# Patient Record
Sex: MALE | Race: BLACK OR AFRICAN AMERICAN | Employment: OTHER | URBAN - METROPOLITAN AREA
[De-identification: names, ages, dates, MRNs, and addresses within clinical notes are randomized per-mention and may not be internally consistent; named-entity substitution may affect disease eponyms.]

---

## 2021-06-09 ENCOUNTER — HOSPITAL ENCOUNTER (EMERGENCY)
Age: 36
Discharge: HOME | End: 2021-06-09
Payer: COMMERCIAL

## 2021-06-09 ENCOUNTER — APPOINTMENT (OUTPATIENT)
Dept: GENERAL RADIOLOGY | Age: 36
End: 2021-06-09
Payer: COMMERCIAL

## 2021-06-09 VITALS
SYSTOLIC BLOOD PRESSURE: 129 MMHG | HEIGHT: 68 IN | RESPIRATION RATE: 16 BRPM | DIASTOLIC BLOOD PRESSURE: 76 MMHG | WEIGHT: 207 LBS | BODY MASS INDEX: 31.37 KG/M2 | TEMPERATURE: 98.5 F | OXYGEN SATURATION: 98 % | HEART RATE: 51 BPM

## 2021-06-09 DIAGNOSIS — S62.639A CLOSED FRACTURE OF TUFT OF DISTAL PHALANX OF FINGER: ICD-10-CM

## 2021-06-09 DIAGNOSIS — S61.219A LACERATION OF FINGER OF RIGHT HAND WITHOUT FOREIGN BODY WITHOUT DAMAGE TO NAIL, UNSPECIFIED FINGER, INITIAL ENCOUNTER: Primary | ICD-10-CM

## 2021-06-09 PROCEDURE — 90471 IMMUNIZATION ADMIN: CPT | Performed by: PHYSICIAN ASSISTANT

## 2021-06-09 PROCEDURE — 73140 X-RAY EXAM OF FINGER(S): CPT

## 2021-06-09 PROCEDURE — 6360000002 HC RX W HCPCS: Performed by: PHYSICIAN ASSISTANT

## 2021-06-09 PROCEDURE — 12001 RPR S/N/AX/GEN/TRNK 2.5CM/<: CPT

## 2021-06-09 PROCEDURE — 90715 TDAP VACCINE 7 YRS/> IM: CPT | Performed by: PHYSICIAN ASSISTANT

## 2021-06-09 PROCEDURE — 99283 EMERGENCY DEPT VISIT LOW MDM: CPT

## 2021-06-09 RX ORDER — BUPIVACAINE HYDROCHLORIDE 5 MG/ML
INJECTION, SOLUTION EPIDURAL; INTRACAUDAL
Status: DISCONTINUED
Start: 2021-06-09 | End: 2021-06-09 | Stop reason: HOSPADM

## 2021-06-09 RX ORDER — BUPIVACAINE HYDROCHLORIDE 5 MG/ML
30 INJECTION, SOLUTION EPIDURAL; INTRACAUDAL ONCE
Status: DISCONTINUED | OUTPATIENT
Start: 2021-06-09 | End: 2021-06-09 | Stop reason: HOSPADM

## 2021-06-09 RX ORDER — NAPROXEN 500 MG/1
500 TABLET ORAL 2 TIMES DAILY
Qty: 20 TABLET | Refills: 0 | Status: ON HOLD | OUTPATIENT
Start: 2021-06-09 | End: 2021-07-27

## 2021-06-09 RX ADMIN — TETANUS TOXOID, REDUCED DIPHTHERIA TOXOID AND ACELLULAR PERTUSSIS VACCINE, ADSORBED 0.5 ML: 5; 2.5; 8; 8; 2.5 SUSPENSION INTRAMUSCULAR at 13:54

## 2021-06-09 ASSESSMENT — ENCOUNTER SYMPTOMS
COUGH: 0
BACK PAIN: 0
SHORTNESS OF BREATH: 0
COLOR CHANGE: 0

## 2021-06-09 ASSESSMENT — PAIN SCALES - GENERAL: PAINLEVEL_OUTOF10: 10

## 2021-06-09 NOTE — ED PROVIDER NOTES
confusion. All other systems reviewed and are negative. Positives and Pertinent negatives as per HPI. Except as noted above in the ROS, all other systems were reviewed and negative. PAST MEDICAL HISTORY   History reviewed. No pertinent past medical history. SURGICAL HISTORY   History reviewed. No pertinent surgical history. [address]       Discharge Medication List as of 6/9/2021  2:53 PM      CONTINUE these medications which have NOT CHANGED    Details   acetaminophen (APAP EXTRA STRENGTH) 500 MG tablet 1 tablet by mouth with breakfast, lunch, dinner, and before bedtime every day for the next 5 days. Take this with your Motrin., Disp-20 tablet, R-0Print               ALLERGIES     Patient has no known allergies. FAMILYHISTORY     History reviewed. No pertinent family history. SOCIAL HISTORY       Social History     Tobacco Use    Smoking status: Former Smoker    Smokeless tobacco: Never Used   Vaping Use    Vaping Use: Never used   Substance Use Topics    Alcohol use: Yes     Comment: once a week    Drug use: Yes     Types: Marijuana     Comment: daily       SCREENINGS             PHYSICAL EXAM    (up to 7 for level 4, 8 or more for level 5)     ED Triage Vitals [06/09/21 1332]   BP Temp Temp Source Pulse Resp SpO2 Height Weight   129/76 98.5 °F (36.9 °C) Oral 51 16 98 % 5' 8\" (1.727 m) 207 lb (93.9 kg)       Physical Exam  Vitals and nursing note reviewed. Constitutional:       Appearance: Normal appearance. He is well-developed. He is not toxic-appearing or diaphoretic. HENT:      Head: Normocephalic and atraumatic. Right Ear: External ear normal.      Left Ear: External ear normal.      Nose: Nose normal.      Mouth/Throat:      Mouth: Mucous membranes are moist.      Pharynx: Oropharynx is clear. Eyes:      General:         Right eye: No discharge. Left eye: No discharge. Cardiovascular:      Rate and Rhythm: Normal rate.       Pulses: FINGERS     6/9/2021 2:01 pm     COMPARISON:   None. HISTORY:   ORDERING SYSTEM PROVIDED HISTORY: injury   TECHNOLOGIST PROVIDED HISTORY:   Reason for exam:->injury   Reason for Exam: laceration   Acuity: Acute   Type of Exam: Initial   Mechanism of Injury: cut with metal garage door     FINDINGS:   There is no evidence of fracture, malalignment, or other acute osseous   abnormality. Is a nondisplaced fracture of the 3rd phalangeal tuft best   appreciated on the lateral view. PROCEDURES   Unless otherwise noted below, none     Lac Repair    Date/Time: 6/9/2021 1:48 PM  Performed by: Oliver Ashley PA-C  Authorized by: Oliver Ashley PA-C     Consent:     Consent obtained:  Verbal    Consent given by:  Patient    Risks discussed:  Pain    Alternatives discussed:  Referral  Anesthesia (see MAR for exact dosages):      Anesthesia method:  Nerve block    Block location:  Digital nerve    Block needle gauge:  27 G    Block anesthetic:  Bupivacaine 0.5% w/o epi    Block injection procedure:  Anatomic landmarks identified, anatomic landmarks palpated, introduced needle, negative aspiration for blood and incremental injection    Block outcome:  Anesthesia achieved  Laceration details:     Location:  Finger    Finger location:  R long finger    Length (cm):  2.5    Depth (mm):  1  Repair type:     Repair type:  Simple  Pre-procedure details:     Preparation:  Patient was prepped and draped in usual sterile fashion  Exploration:     Hemostasis achieved with:  Direct pressure    Wound exploration: wound explored through full range of motion and entire depth of wound probed and visualized      Wound extent: no foreign bodies/material noted, no muscle damage noted, no nerve damage noted, no tendon damage noted, no underlying fracture noted and no vascular damage noted      Contaminated: no    Treatment:     Area cleansed with:  Hibiclens    Amount of cleaning:  Extensive    Irrigation solution: Sterile saline    Irrigation volume:  1 L    Irrigation method:  Tap  Skin repair:     Repair method:  Sutures    Suture size:  5-0    Suture material:  Nylon    Suture technique:  Simple interrupted    Number of sutures:  3  Approximation:     Approximation:  Close  Post-procedure details:     Dressing:  Non-adherent dressing and sterile dressing    Patient tolerance of procedure: Tolerated well, no immediate complications        CRITICAL CARE TIME   N/A    CONSULTS:  None      EMERGENCY DEPARTMENT COURSE and DIFFERENTIAL DIAGNOSIS/MDM:   Vitals:    Vitals:    06/09/21 1332   BP: 129/76   Pulse: 51   Resp: 16   Temp: 98.5 °F (36.9 °C)   TempSrc: Oral   SpO2: 98%   Weight: 207 lb (93.9 kg)   Height: 5' 8\" (1.727 m)       Patient was given the following medications:  Medications   bupivacaine (PF) (MARCAINE) 0.5 % injection 150 mg (has no administration in time range)   bupivacaine (PF) (MARCAINE) 0.5 % injection (has no administration in time range)   Tetanus-Diphth-Acell Pertussis (BOOSTRIX) injection 0.5 mL (0.5 mLs Intramuscular Given 6/9/21 1354)           This patient presents to the emergency department with lacerations to right index and middle finger status post crushing injury. Although patient has a nondisplaced phalangeal tuft fracture, there is no overlying laceration, therefore do not consider this open fracture. He tolerated laceration repair well without immediate complications or emesis. Wound care applied. AlumaFoam splint placed on the middle digit of the right hand. Advised to follow-up with PCP and was given orthopedic hand referral.  Will be sent home with anti-inflammatory as needed for pain.     My suspicion is low for subungual hematoma, paronychia, eponychia, felon, flexor tenosynovitis,  ACS, PE, thoracic aortic dissection, thoracic outlet obstruction, SVC syndrome, foreign body, tendon rupture, compartment syndrome, acute open fracture, dislocation, DVT, arterial compromise or occlusion, limb ischemia, gout, septic joint, abscess, cellulitis, osteomyelitis, or other concerning pathology. FINAL IMPRESSION      1. Laceration of right index and right middle finger of right hand without foreign body without damage to nail, unspecified finger, initial encounter    2.  Closed fracture of tuft of distal phalanx of finger          DISPOSITION/PLAN   DISPOSITION Decision To Discharge 06/09/2021 02:46:33 PM      PATIENT REFERRED TO:  Carissa Hensley MD  [address] [address]  [phone]      for follow up in 1-3 days    [facility] Emergency Department  [place] [address]  [phone]    If symptoms worsen    Daryle Artist, MD  [facility], [address] [place] [address],[address]  [address] [place], [place] [id number]      for orthopedic hand follow up      DISCHARGE MEDICATIONS:  Discharge Medication List as of 6/9/2021  2:53 PM      START taking these medications    Details   naproxen (NAPROSYN) 500 MG tablet Take 1 tablet by mouth 2 times daily for 20 doses, Disp-20 tablet, R-0Print             DISCONTINUED MEDICATIONS:  Discharge Medication List as of 6/9/2021  2:53 PM      STOP taking these medications       ibuprofen (IBU) 800 MG tablet Comments:   Reason for Stopping:                      (Please note that portions of this note were completed with a voice recognition program.  Efforts were made to edit the dictations but occasionally words are mis-transcribed.)    Corey Hutson PA-C (electronically signed)           Corey Hutson PA-C  06/09/21 [id number]

## 2021-06-10 ENCOUNTER — TELEPHONE (OUTPATIENT)
Dept: ORTHOPEDIC SURGERY | Age: 36
End: 2021-06-10

## 2021-06-18 ENCOUNTER — TELEPHONE (OUTPATIENT)
Dept: ORTHOPEDIC SURGERY | Age: 36
End: 2021-06-18

## 2021-06-18 NOTE — TELEPHONE ENCOUNTER
Appointment Request     Patient requesting earlier appointment: Yes  Appointment offered to patient: YES  Patient Contact Number: [phone]    PATIENT SEEN IN  ER 6/9/21 FOR RT INDEX AND MIDDLE FINGER. PATIENT WAS TOLD AT THAT TIME THAT HIS FINGER WAS FX.    1ST AVAIL IN  WAS WITH NATHALIE ON 7/14/21.    1ST AVAIL IN [facility] WITH DR. Gina Haney 7/6/21.     PLEASE CALL BACK PATIENT AT THE ABOVE NUMBER

## 2021-07-06 ENCOUNTER — OFFICE VISIT (OUTPATIENT)
Dept: ORTHOPEDIC SURGERY | Age: 36
End: 2021-07-06
Payer: COMMERCIAL

## 2021-07-06 VITALS — BODY MASS INDEX: 28.88 KG/M2 | RESPIRATION RATE: 16 BRPM | WEIGHT: 195 LBS | HEIGHT: 69 IN

## 2021-07-06 DIAGNOSIS — S56.429S EXTENSOR TENDON LACERATION OF FINGER WITH OPEN WOUND, SEQUELA: Primary | ICD-10-CM

## 2021-07-06 DIAGNOSIS — S62.662B OPEN NONDISPLACED FRACTURE OF DISTAL PHALANX OF RIGHT MIDDLE FINGER, INITIAL ENCOUNTER: ICD-10-CM

## 2021-07-06 DIAGNOSIS — S61.209S EXTENSOR TENDON LACERATION OF FINGER WITH OPEN WOUND, SEQUELA: Primary | ICD-10-CM

## 2021-07-06 PROBLEM — S56.429A EXTENSOR TENDON LACERATION OF FINGER WITH OPEN WOUND: Status: ACTIVE | Noted: 2021-07-06

## 2021-07-06 PROBLEM — S61.209A EXTENSOR TENDON LACERATION OF FINGER WITH OPEN WOUND: Status: ACTIVE | Noted: 2021-07-06

## 2021-07-06 PROCEDURE — 99203 OFFICE O/P NEW LOW 30 MIN: CPT | Performed by: ORTHOPAEDIC SURGERY

## 2021-07-06 PROCEDURE — G8419 CALC BMI OUT NRM PARAM NOF/U: HCPCS | Performed by: ORTHOPAEDIC SURGERY

## 2021-07-06 PROCEDURE — 1036F TOBACCO NON-USER: CPT | Performed by: ORTHOPAEDIC SURGERY

## 2021-07-06 PROCEDURE — G8427 DOCREV CUR MEDS BY ELIG CLIN: HCPCS | Performed by: ORTHOPAEDIC SURGERY

## 2021-07-06 NOTE — LETTER
CONSENT TO OPERATION  AND/OR OTHER PROCEDURE(S)          PATIENT : Efren Gallegos   YOB: 1985      DATE : 7/6/21          1. I request and consent that Dr. Reji Meza. Denisa Garza,  and/or his associates or assistants perform an operation and/or procedures on the above patient at  [place] [address], to treat the condition(s) which appear indicated by the diagnostic studies already performed. I have been told that in general terms the nature, purpose and reasonable expectations of the operation and/or procedure(s) are:     Secondary Repair Lacerated Extensor Tendon Right Middle Finger (DIP Joint)      2. It has been explained to me by the informing physician that during the course of the operation and/or procedure(s) unforeseen conditions may be revealed that necessitate an extension of the original operation and/or procedure(s) or different operation and/or procedures than those set forth in Paragraph 1. I therefore authorize and request that my physician and/or his associates or assistants perform such operations and/or procedures as are necessary and desirable in the exercise of professional judgment. The authority granted under this Paragraph 2 shall extend to all conditions that require treatment and are known to my physician at the time the operation is commenced. 3. I have been made aware by the informing physician of certain risks and consequences that are associated with the operation and/or procedure(s) described in Paragraph 1, the reasonable alternative methods or treatment, the possible consequences, the possibility that the operation and/or procedure(s) may be unsuccessful and the possibility of complications.   I understand the reasonably known risks to be:      - Bleeding  - Infection  - Poor Healing  - Possible Damage to Nerve, Vessel, Tendon/Muscle or Bone  - Need for further Treatment/Surgery  - Stiffness  - Pain  - Residual or Recurrent Symptoms  - Anesthetic and/or Medical Risks  - We have discussed the specific limitations and risks of hospital and/or office based treatment at this time due to the COVID-19 pandemic                I have been counseled about the risks of jim Covid-19 in the rissa-operative and post-operative periods related to this procedure. I have been made aware that jim Covid-19 around the time of a surgical procedure may worsen my prognosis for recovering from the virus and lend to a higher morbidity and or mortality risk. With this knowledge, I have requested to proceed with the procedure as scheduled. 4. I have also been informed by the informing physician that there are other risks from both known and unknown causes that are attendant to the performance of any surgical procedure. I am aware that the practice of medicine and surgery is not an exact science, and that no guarantees have been made to me concerning the results of the operation and/or procedure(s). 5. I   CONSENT / REFUSE CONSENT  (strike the phrase that does not apply) to the taking of photographs before, during and/or after the operation or procedure for scientific/educational purposes. 6. I consent to the administration of anesthesia and to the use of such anesthetics as may be deemed advisable by the anesthesiologist who has been engaged by me or my physician.     7. I certify that I have read and understand the above consent to operation and/or other procedure(s); that the explanations therein referred to were made to me by the informing physician in advance of my signing this consent; that all blanks or statements requiring insertion or completion were filled in and inapplicable paragraphs, if any, were stricken before I signed; and that all questions asked by me about the operation and/or procedure(s) which I have consented to have been fully answered in a satisfactory manner.                                 _______________________           7/6/21 Witness     Signature Of Patient         Date        Urmila Carreon                                                 Informing Physician                                           Signature of Informing Physician                              If patient is unable to sign or is a minor, complete one of the following:    (A)  Patient is a minor   years of age. (B)  Patient is unable to sign because: The undersigned represents that he or she is duly authorized to execute this consent for and on behalf of the above named patient. Witness               o  Parent  o  Guardian   o  Spouse       o  Other (specify)                                                          Patient Name: Roberto Vidal  Patient YOB: 1985  Dr. Lillian Johnson' Return To Work Policy  Regarding your ability to return to work after surgery or injury, Dr. Lillian Johnson will not state that any patient is off of work or cannot work at all. He will place you on restrictions after your surgical procedure or injury. This means that you will be allowed to return to work the day after your office visit or surgery with restrictions. Depending on the details of your particular situation, Dr. Lillian Johnson may state that you will have either light use or no use of your hand for a specific number of weeks. It is your obligation to communicate with your employer regarding your restrictions. It is your employer's decision as to whether they will accommodate your restrictions (i.e. allow you to come to work in your restricted capacity) or to not allow you to return to work under your restrictions. Dr. Lillian Johnson does not participate in making this decision and cannot influence your employer regarding their decision. If you do not communicate your restrictions to your employer, or if you do not present to work as you are scheduled to, Dr. Lillian Johnson will not provide an 'excuse' to explain your absence.   A doctors note, or

## 2021-07-06 NOTE — LETTER
[facility] SURGERY  Surgery Scheduling Form:      DEMOGRAPHICS:                                                                                                                Patient Name:  Alena Logan  Patient :  1985   Patient SS#:  [id number]    Patient Phone:  [phone] (home)     Patient Address:  [address] [place] [id number]    PCP:  No primary care provider on file. Insurance:   Payor/Plan Subscr  Sex Relation Sub. Ins. ID Effective Group Num   1. [employer] - * DORA HEBERT 1985 Male Self [id number] 3/1/19 [employer]OHIO                                   [address]     DIAGNOSIS & PROCEDURE:                                                                                              Diagnosis:   right Middle Finger Terminal Extensor Tendon Laceration (883.2)   S56.429S  Operation:  right Middle Finger Terminal Extensor Tendon Repair  [CPT: 00380]  Location:  [place]   Surgeon:  Boubacar Case    SCHEDULING INFORMATION:                                                                                         .    Surgeon's Scheduling Instruction:  elective    RN Post-op Appt:  [] Yes   [x] No  Preferred Thursday:   [x] Yes   [] No    Requested Date:   PT NOT NPO- Case CXLD  OR Time:  12:30 Patient Arrival Time:  11:00  OR Time Required:  30  Minutes  Anesthesia:  General  Equipment:  Lead Hand,  4-0 Fiber-Wire suture, Tendon retrievers, K-wires  Mini C-Arm:  No   Standard C-Arm:  No  Status:  Outpatient                   COVID:  RAPID  PAT Required:  Yes  Comments: Patient wants ASAP but not urgent. Daron Hensley MD  21 12:56 PM  BILLING INFORMATION:                                                                                                     Procedure:       CPT Code Modifier    right Middle Finger Terminal Extensor Tendon Repair       .          Pre Operative Physician Prophylaxis Orders - SCIP Protocols      Pre-Operative Antibiotic Order:    No Known Allergies       []  ----  No Antibiotic Ordered       [x]  ----  Give the following Antibiotic within 1 hour prior to start time:         Ancef 1 gram IV if patient is less than 200 pounds    or       Ancef 2 grams IV if patient is greater than 200 pounds    or      Vancomycin 1 gram IV (over 1 hour) if patient is allergic to           PENICILLINS or CEFALOSPORINS     SURG   7                           COVID  RAPID                   H&P AT PCP       Procedure: right Middle Finger Terminal Extensor Tendon Repair     Patient: Albinabrian Riri  :    1985    Physician Signature:     Date: 21  Time: 12:56 PM

## 2021-07-06 NOTE — PROGRESS NOTES
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 He noticed pain, swelling and bleeding of the dorsum of his right middle and index fingers. He was evaluated at the [facility] were obtained, the patients dorsal lacerations were sutured, tetanus updated and the patient was referred for hand/upper extremity evaluation and treatment. There is no history of additional significant injury. He removed his own sutures and found that he was unable to actively extend his middle finger. Symptoms have not changed recently. The pain assessment has been reviewed and is correct. The patient's social history, past medical history, family history, medications, allergies and review of systems, entered 7/6/21,  have been reviewed, and dated and are recorded in the chart. On physical examination the patient is Height: 5' 9\" (175.3 cm) tall and weighs Weight: 195 lb (88.5 kg). Respirations are 18 per minute. The patient is well nourished, is oriented to time and place, demonstrates appropriate mood and affect as well as normal gait and station. There is mild soft tissue swelling present about the right index finger, middle finger. There are healed oblique lacerations over the dorsum of the DIP joints. There is mild discoloration. There is a flexion deformity of the DIP joint of the middle finger, with no active extension. .   Tenderness is not present on palpation in the area of the right index finger, middle finger. Skin is intact, as is distal circulation and sensation. Gross muscle strength is normal bilaterally   Hand and wrist joints are stable. There are no subcutaneous nodules or enlarged epitrochlear lymph nodes.     I have personally reviewed and interpreted all previous external imaging studies, laboratory tests, diagnostic proceedures and medical encounters pertinent to this patient's visit today. Xrays: Review and independent interpretation of the recent external Xrays of the right middle finger demonstrate a nondisplaced tuft fracture. Impression: Laceration distal extensor tendon right middle finger. Healed tuft fracture right middle finger. The nature of their medical problem is fully discussed with the patient, including all treatment options. Surgery is also discussed, including the possible risks, complications, prognosis and postoperative care. All questions are answered. The surgery consent form is explained and signed. Surgery will be scheduled and the patient is asked to call me if there are any additional questions. The patient understands that the surgery will be done by Dr. Bhupendra Aparicio.

## 2021-07-06 NOTE — Clinical Note
PT arrived 1 hour late and NOT NPO. CASE CANCELLED. DO NOT RESCHEDULE WITHOUT SPEAKING WITH ME. Thanks.   CBW

## 2021-07-09 ENCOUNTER — TELEPHONE (OUTPATIENT)
Dept: ORTHOPEDIC SURGERY | Age: 36
End: 2021-07-09

## 2021-07-09 NOTE — TELEPHONE ENCOUNTER
CPT: 53398  BODY PART: right finger  AUTHORIZATION: approved    Submitted online with Allie 7/9/21    Per Allie, case is pending 7/16/21    Per Allie, [facility] 7/20/21    Per Adonis Gutiérrez with Pradeep Thurman is required. Leadership will be made aware of mistake and this precert will be reprocessed 7/20/21    Approved # [id number]  7/27/21-10/27/21    Called Allie to update authorization.   Same auth#  Now good through 12/31/21

## 2021-07-23 NOTE — PROGRESS NOTES
SAFETY FIRST. .call before you fall      [address]  time____1100________        Surgery time____________    Take the following medications with a sip of water: Follow your Doctor's pre procedure instructions regarding medications    Do not eat or drink anything after 12:00 midnight prior to your surgery. This includes water chewing gum, mints and ice chips. You may brush your teeth and gargle the morning of your surgery, but do not swallow the water     Please see your family doctor/pediatrician for a history and physical and/or concerning medications. Bring any test results/reports from your physicians office. If you are under the care of a heart doctor or specialist doctor, please be aware that you may be asked to them for clearance    You may be asked to stop blood thinners such as Coumadin, Plavix, Fragmin, Lovenox, etc., or any anti-inflammatories such as:  Aspirin, Ibuprofen, Advil, Naproxen prior to your surgery. We also ask that you stop any OTC medications such as fish oil, vitamin E, glucosamine, garlic, Multivitamins, COQ 10, etc.    We ask that you do not smoke 24 hours prior to surgery  We ask that you do not  drink any alcoholic beverages 24 hours prior to surgery     You must make arrangements for a responsible adult to take you home after your surgery. For your safety you will not be allowed to leave alone or drive yourself home. Your surgery will be cancelled if you do not have a ride home. Also for your safety, it is strongly suggested that someone stay with you the first 24 hours after your surgery. A parent or legal guardian must accompany a child scheduled for surgery and plan to stay at the hospital until the child is discharged. Please do not bring other children with you. For your comfort, please wear simple loose fitting clothing to the hospital.  Please do not bring valuables.     Do not wear any make-up or nail polish on your fingers or toes      For your safety, please do not wear any jewelry or body piercing's on the day of surgery. All jewelry must be removed. If you have dentures, they will be removed before going to operating room. For your convenience, we will provide you with a container. If you wear contact lenses or glasses, they will be removed, please bring a case for them. If you have a living will and a durable power of  for healthcare, please bring in a copy. As part of our patient safety program to minimize surgical site infections, we ask you to do the following:    · Please notify your surgeon if you develop any illness between         now and the  day of your surgery. · This includes a cough, cold, fever, sore throat, nausea,         or vomiting, and diarrhea, etc.  ·  Please notify your surgeon if you experience dizziness, shortness         of breath or blurred vision between now and the time of your surgery. Do not shave your operative site 96 hours prior to surgery. For face and neck surgery, men may use an electric razor 48 hours   prior to surgery. You may shower the night before surgery or the morning of   your surgery with an antibacterial soap. You will need to bring a photo ID and insurance card    [facility] has an onsite pharmacy, would you like to utilize our pharmacy     If you will be staying overnight and use a C-pap machine, please bring   your C-pap to hospital     Our goal is to provide you with excellent care, therefore, visitors will be limited to two(2) in the room at a time so that we may focus on providing this care for you. Please contact pre-admission testing if you have any further questions.                  [facility] phone number:  [phone] PAT fax number:  [phone]  Please note these are generalized instructions for all surgical cases, you may be provided with more specific instructions according to your surgery. Preoperative Screening for Elective Surgery/Invasive Procedures While COVID-19 present in the community     Have you tested positive or have been told to self-isolate for COVID-19 like symptoms within the past 28 days? no   Do you currently have any of the following symptoms?no  o Fever >100.0 F or 99.9 F in immunocompromised patients? New onset cough, shortness of breath or difficulty breathing?  o New onset sore throat, myalgia (muscle aches and pains), headache, loss of taste/smell or diarrhea?  Have you had a potential exposure to COVID-19 within the past 14 days by:  o Close contact with a confirmed case? o Close contact with a healthcare worker,  or essential infrastructure worker (grocery store, [employer], gas station, public utilities or transportation)? o Do you reside in a congregate setting such as; skilled nursing facility, adult home, correctional facility, homeless shelter or other institutional setting?  o Have you had recent travel to a known COVID-19 hotspot? Indicate if the patient has a positive screen by answering yes to one or more of the above questions. Patients who test positive or screen positive prior to surgery or on the day of surgery should be evaluated in conjunction with the surgeon/proceduralist/anesthesiologist to determine the urgency of the procedure.

## 2021-07-23 NOTE — PROGRESS NOTES
Patient stated he will be going to an urgent care or [facility] for H&P, instructed to make the appointment as soon as possible. Instructed to bring a hard copy and have it faxed to [phone].

## 2021-07-26 ENCOUNTER — TELEPHONE (OUTPATIENT)
Dept: ORTHOPEDIC SURGERY | Age: 36
End: 2021-07-26

## 2021-07-27 ENCOUNTER — HOSPITAL ENCOUNTER (OUTPATIENT)
Age: 36
Setting detail: OUTPATIENT SURGERY
Discharge: HOME | End: 2021-07-27
Attending: ORTHOPAEDIC SURGERY | Admitting: ORTHOPAEDIC SURGERY
Payer: COMMERCIAL

## 2021-07-27 VITALS
OXYGEN SATURATION: 96 % | DIASTOLIC BLOOD PRESSURE: 85 MMHG | WEIGHT: 202.71 LBS | HEIGHT: 68 IN | BODY MASS INDEX: 30.72 KG/M2 | HEART RATE: 54 BPM | SYSTOLIC BLOOD PRESSURE: 134 MMHG | TEMPERATURE: 97.1 F | RESPIRATION RATE: 16 BRPM

## 2021-07-27 LAB — SARS-COV-2, NAAT: NOT DETECTED

## 2021-07-27 PROCEDURE — 87635 SARS-COV-2 COVID-19 AMP PRB: CPT

## 2021-07-27 RX ORDER — SODIUM CHLORIDE 0.9 % (FLUSH) 0.9 %
10 SYRINGE (ML) INJECTION EVERY 12 HOURS SCHEDULED
Status: DISCONTINUED | OUTPATIENT
Start: 2021-07-27 | End: 2021-07-27 | Stop reason: HOSPADM

## 2021-07-27 RX ORDER — SODIUM CHLORIDE 9 MG/ML
25 INJECTION, SOLUTION INTRAVENOUS PRN
Status: DISCONTINUED | OUTPATIENT
Start: 2021-07-27 | End: 2021-07-27 | Stop reason: HOSPADM

## 2021-07-27 RX ORDER — SODIUM CHLORIDE 9 MG/ML
INJECTION, SOLUTION INTRAVENOUS CONTINUOUS
Status: DISCONTINUED | OUTPATIENT
Start: 2021-07-27 | End: 2021-07-27 | Stop reason: HOSPADM

## 2021-07-27 RX ORDER — SODIUM CHLORIDE 0.9 % (FLUSH) 0.9 %
10 SYRINGE (ML) INJECTION PRN
Status: DISCONTINUED | OUTPATIENT
Start: 2021-07-27 | End: 2021-07-27 | Stop reason: HOSPADM

## 2021-07-27 ASSESSMENT — PAIN - FUNCTIONAL ASSESSMENT: PAIN_FUNCTIONAL_ASSESSMENT: 0-10

## 2021-07-27 NOTE — PROGRESS NOTES
I called patient to see if he was on his way in. He  was very agitated and said \"they told me to be there 1/2 hour before my appointment\". I asked who \"they\" was. He said all the phone calls, \"very unprofessional\" and \"I should just cancel\". I told him that was his choice. He didn't understand why he needed to be here 1-1/2 hour before his procedure. I let him know. ..labs, questions, VS, IV's, etc. He said he would head on in but \"they\" told him 12:00.

## 2021-07-27 NOTE — PROGRESS NOTES
Pt drank 12 oz of milk protein drink about an hour ago. Charge RN and Dr. Harry Smith informed. Pt canceled.

## 2021-08-29 ENCOUNTER — APPOINTMENT (OUTPATIENT)
Dept: GENERAL RADIOLOGY | Age: 36
End: 2021-08-29
Payer: COMMERCIAL

## 2021-08-29 ENCOUNTER — HOSPITAL ENCOUNTER (EMERGENCY)
Age: 36
Discharge: HOME | End: 2021-08-29
Payer: COMMERCIAL

## 2021-08-29 VITALS
RESPIRATION RATE: 12 BRPM | BODY MASS INDEX: 31.01 KG/M2 | HEIGHT: 68 IN | DIASTOLIC BLOOD PRESSURE: 92 MMHG | SYSTOLIC BLOOD PRESSURE: 152 MMHG | OXYGEN SATURATION: 100 % | WEIGHT: 204.59 LBS | TEMPERATURE: 98.1 F | HEART RATE: 50 BPM

## 2021-08-29 DIAGNOSIS — S51.812A FOREARM LACERATION, LEFT, INITIAL ENCOUNTER: Primary | ICD-10-CM

## 2021-08-29 PROCEDURE — 12002 RPR S/N/AX/GEN/TRNK2.6-7.5CM: CPT

## 2021-08-29 PROCEDURE — 99282 EMERGENCY DEPT VISIT SF MDM: CPT

## 2021-08-29 PROCEDURE — 73090 X-RAY EXAM OF FOREARM: CPT

## 2021-08-29 RX ORDER — LIDOCAINE HYDROCHLORIDE AND EPINEPHRINE 10; 10 MG/ML; UG/ML
20 INJECTION, SOLUTION INFILTRATION; PERINEURAL ONCE
Status: DISCONTINUED | OUTPATIENT
Start: 2021-08-29 | End: 2021-08-29 | Stop reason: HOSPADM

## 2021-08-29 RX ORDER — IBUPROFEN 600 MG/1
600 TABLET ORAL EVERY 6 HOURS PRN
Qty: 20 TABLET | Refills: 0 | Status: SHIPPED | OUTPATIENT
Start: 2021-08-29

## 2021-08-29 ASSESSMENT — PAIN DESCRIPTION - ORIENTATION
ORIENTATION: LEFT
ORIENTATION: LEFT

## 2021-08-29 ASSESSMENT — PAIN DESCRIPTION - FREQUENCY: FREQUENCY: CONTINUOUS

## 2021-08-29 ASSESSMENT — PAIN SCALES - GENERAL
PAINLEVEL_OUTOF10: 10
PAINLEVEL_OUTOF10: 7

## 2021-08-29 ASSESSMENT — PAIN DESCRIPTION - LOCATION
LOCATION: ARM
LOCATION: ARM

## 2021-08-29 ASSESSMENT — PAIN DESCRIPTION - DESCRIPTORS: DESCRIPTORS: BURNING;THROBBING

## 2021-08-29 ASSESSMENT — PAIN - FUNCTIONAL ASSESSMENT: PAIN_FUNCTIONAL_ASSESSMENT: PREVENTS OR INTERFERES WITH MANY ACTIVE NOT PASSIVE ACTIVITIES

## 2021-08-29 ASSESSMENT — PAIN DESCRIPTION - PROGRESSION: CLINICAL_PROGRESSION: GRADUALLY WORSENING

## 2021-08-29 ASSESSMENT — PAIN DESCRIPTION - PAIN TYPE: TYPE: ACUTE PAIN

## 2021-08-29 NOTE — ED PROVIDER NOTES
[facility]  [address] [address]  Dept: [phone]  Loc: [address] ENCOUNTER        This patient was not seen or evaluated by the attending physician. I evaluated this patient, the attending physician was available for consultation. CHIEF COMPLAINT    Chief Complaint   Patient presents with    Laceration     left posterior forearm laceration. patient states arm went through a glass window on an old garage door. bleeding controlled. tetanus up to date       HPI    Maxine Stevens is a 39 y.o. male who lacerated his left forearm. The mechanism of the injury was that he cut his arm on a piece of glass. This occurred shortly prior to arrival.  Associated bleeding was alleviated by pressure. REVIEW OF SYSTEMS    Neurologic: No numbness or weakness distal to the wound  Skin: see HPI  Musculoskeletal: No bony deformity  Immunization: Tetanus status up to date from 6/2021    [address]    History reviewed. No pertinent past medical history. Past Surgical History:   Procedure Laterality Date    DENTAL SURGERY         CURRENT MEDICATIONS    Current Outpatient Rx   Medication Sig Dispense Refill    acetaminophen (APAP EXTRA STRENGTH) 500 MG tablet 1 tablet by mouth with breakfast, lunch, dinner, and before bedtime every day for the next 5 days. Take this with your Motrin.  20 tablet 0       ALLERGIES    No Known Allergies    SOCIAL & FAMILY HISTORY    Social History     Socioeconomic History    Marital status:      Spouse name: None    Number of children: None    Years of education: None    Highest education level: None   Occupational History    None   Tobacco Use    Smoking status: Former Smoker    Smokeless tobacco: Never Used   Vaping Use    Vaping Use: Never used   Substance and Sexual Activity    Alcohol use: Yes     Comment: once a week    Drug use: Yes     Types: Marijuana     Comment: daily    Sexual activity: Yes   Other Topics Concern    None   Social History Narrative    None     Social Determinants of Health     Financial Resource Strain:     Difficulty of Paying Living Expenses:    Food Insecurity:     Worried About Running Out of Food in the Last Year:     Ran Out of Food in the Last Year:    Transportation Needs:     Lack of Transportation (Medical):  Lack of Transportation (Non-Medical):    Physical Activity:     Days of Exercise per Week:     Minutes of Exercise per Session:    Stress:     Feeling of Stress :    Social Connections:     Frequency of Communication with Friends and Family:     Frequency of Social Gatherings with Friends and Family:     Attends Protestant Services:     Active Member of Clubs or Organizations:     Attends Club or Organization Meetings:     Marital Status:    Intimate Partner Violence:     Fear of Current or Ex-Partner:     Emotionally Abused:     Physically Abused:     Sexually Abused:      History reviewed. No pertinent family history. PHYSICAL EXAM    VITAL SIGNS: BP (!) 152/92   Pulse 50   Temp 98.1 °F (36.7 °C) (Oral)   Resp 12   Ht 5' 8\" (1.727 m)   Wt 204 lb 9.4 oz (92.8 kg)   SpO2 100%   BMI 31.11 kg/m²   Constitutional:  Well developed, well-nourished  HENT:  atraumatic, no trismus  NECK:  Supple, No neck swelling  Respiratory:  No respiratory distress  Cardiovascular:  No JVD   Neurologic: Motor and sensory distal to the wound is intact and normal, patient is awake, alert, no slurred speech  Vascular: left radial pulse 2+, capillary refill less than 2 seconds  Musculoskeletal:  No edema or deformity  Integument:  +3 cm V-shaped laceration localized over the medial distal volar forearm, the depth down to the subcutaneous tissue, there is no foreign body in the wound, there is no tendon or bone exposed in the wound.     PROCEDURE  Laceration Repair Procedure Note  Performed by: myself  Consent:  Verbal consent obtained by patient. Risk of infection and pain discussed, as well as alternatives. Anesthesia:  The patient was placed in the appropriate position and anesthesia obtained by infiltration using 1% Lidocaine with epinephrine. Repair type:  simple      Pre-procedure:  Patient was prepped and draped in usual sterile fashion  Laceration details:  Location: forearm  Length (cm):  3 cm  Preparation:  Patient was prepped and draped in usual sterile fashion  Exploration: Hemostasis achieved with direct pressure, entire depth of wound probed and visualized   Contaminated: no  Treatment:  Amount of cleaning:  Extensive  Irrigation solution:  High pressure tap water  Visualized foreign bodies/material removed: no  Skin repair:   Repair method:  Sutures  Suture size:  4-0  Suture material:  Nylon  Suture technique:  Simple interrupted  Approximation:  Close  Number of sutures: 5  Dressing:  Sterile dressing and antibiotic ointment  Patient tolerance of procedure: Tolerated well, no immediate complications    RADIOLOGY  XR RADIUS ULNA LEFT (2 VIEWS)   Final Result   Soft tissue wound evident distal left forearm. No associated retained   radiopaque foreign body evident. No acute osseous abnormality. Note that if pain persists or worsens, then additional evaluation with   follow-up x-rays or MRI should be considered. ED COURSE & MEDICAL DECISION MAKING    See chart for details of any medications ordered    Differential diagnosis: Tendon laceration, neurologic injury, vascular injury, involvement of bone that could lead to osteomyelitis, retained foreign body, delayed bacterial skin infection, other    No evidence of neurovascular injury on physical exam.  No evidence of tendon laceration. Plain films as above.     The patient was instructed to follow up as an outpatient in 2 days, and to return in 10 days for suture removal.  The patient was instructed to return to the ED immediately for any new or worsening symptoms. The patient verbalized understanding. FINAL IMPRESSION    1.  Forearm laceration, left, initial encounter        PLAN  Discharge with outpatient follow-up (see EMR)      (Please note that this note was completed with a voice recognition program.  Every attempt was made to edit the dictations, but inevitably there remain words that are mis-transcribed.)        Juliana Mackeyma  08/29/21 1536

## 2021-08-29 NOTE — ED NOTES
Adaptic, and gauze bandage applied to left forearm wound. AVS reviewed with patient. Verbalized understanding. AVS was printed and given to patient. Printed prescription given to patient.      Luis De La Cruz RN  08/29/21 2009

## 2021-09-08 ENCOUNTER — TELEPHONE (OUTPATIENT)
Dept: ORTHOPEDIC SURGERY | Age: 36
End: 2021-09-08

## 2021-09-08 NOTE — TELEPHONE ENCOUNTER
Surgery and/or Procedure Scheduling     Contact Name: Nneka Golden Request: PATIENT IS READY TO [address] [place] [address] - RIGHT HAND  Patient Contact Number: [phone]

## 2021-10-21 ENCOUNTER — TELEPHONE (OUTPATIENT)
Dept: ORTHOPEDIC SURGERY | Age: 36
End: 2021-10-21

## 2021-10-21 NOTE — TELEPHONE ENCOUNTER
General Question     Subject: WOULD LIKE TO KNOW HIS [address] TIME TOMORROW   Patient and /or Facility Request: PATIENT   Contact Number: [phone]

## 2021-10-22 ENCOUNTER — OFFICE VISIT (OUTPATIENT)
Dept: ORTHOPEDIC SURGERY | Age: 36
End: 2021-10-22
Payer: COMMERCIAL

## 2021-10-22 VITALS — BODY MASS INDEX: 30.31 KG/M2 | HEIGHT: 68 IN | WEIGHT: 200 LBS

## 2021-10-22 DIAGNOSIS — S61.209S EXTENSOR TENDON LACERATION OF FINGER WITH OPEN WOUND, SEQUELA: Primary | ICD-10-CM

## 2021-10-22 DIAGNOSIS — S56.429S EXTENSOR TENDON LACERATION OF FINGER WITH OPEN WOUND, SEQUELA: Primary | ICD-10-CM

## 2021-10-22 PROCEDURE — 1036F TOBACCO NON-USER: CPT | Performed by: ORTHOPAEDIC SURGERY

## 2021-10-22 PROCEDURE — G8484 FLU IMMUNIZE NO ADMIN: HCPCS | Performed by: ORTHOPAEDIC SURGERY

## 2021-10-22 PROCEDURE — G8417 CALC BMI ABV UP PARAM F/U: HCPCS | Performed by: ORTHOPAEDIC SURGERY

## 2021-10-22 PROCEDURE — G8427 DOCREV CUR MEDS BY ELIG CLIN: HCPCS | Performed by: ORTHOPAEDIC SURGERY

## 2021-10-22 PROCEDURE — 99214 OFFICE O/P EST MOD 30 MIN: CPT | Performed by: ORTHOPAEDIC SURGERY

## 2021-10-22 NOTE — LETTER
[facility] SURGERY  Surgery Scheduling Form:  DEMOGRAPHICS:                                                                                                              .  Patient Name:  Nereida Mayo  Patient :  1985   Patient SS#:  [id number]    Patient Phone:  [phone] (home)  Alt. Patient Phone:    Patient Address:  [address] [address], [place] [address]    PCP:  No primary care provider on file. Insurance:  Payor/Plan Subscr  Sex Relation Sub. Ins. ID Effective Group Num   1. [employer] - * DORA HEBERT 1985 Male Self [id number] 3/[birth year]/19 [facility] [address]     DIAGNOSIS & PROCEDURE:                                                                                            .  Diagnosis:   right Middle Finger Terminal Extensor Tendon Laceration (883.2) Y75.521S  Operation:  right Middle Finger Terminal Extensor Tendon Repair  [CPT: 40432]  [facility]  [facility]  Surgeon:  Natanael Velazquez    SCHEDULING INFORMATION:                                                                                         .  Surgeon's Scheduling Instruction:  elective    RN Post-op Appt:  [] Yes   [x] No  Preferred Thursday:   [x] Yes   [] No    Requested Date:  CXL  OR Time:  2:45 Patient Arrival Time:    OR Time Required:  30  Minutes  Anesthesia:  General  Equipment:  Lead Hand,  4-0 Fiber-Wire suture, MICRO-MITEK Anchors X2, TPS, K-Wires  Mini C-Arm:  No   Standard C-Arm:  No  Status:  Outpatient                                      COVID  10-29  PAT Required:  Yes  Comments:                      Martell Sow MD  10/22/21 2:25 PM    BILLING INFORMATION:                                                                                                    .    Procedure:       CPT Code Modifier  right Middle Finger Terminal Extensor Tendon Repair     .                              Pre Operative Physician Prophylaxis Orders - SCIP Protocols      Pre-Operative Antibiotic Order:    No Known Allergies       []  ----  No Antibiotic Ordered       [x]  ----  Give the following Antibiotic within 1 hour prior to start time:         Ancef 1 gram IV if patient is less than 200 pounds    or       Ancef 2 grams IV if patient is greater than 200 pounds    or      Vancomycin 1 gram IV (over 1 hour) if patient is allergic to           PENICILLINS or CEFALOSPORINS     SURG    11-4                        COVID    10-29                      H&P AT PCP       Procedure: right Middle Finger Terminal Extensor Tendon Repair      Patient: Tracy Course  :    1985    Physician Signature:     Date: 10/22/21  Time: 2:25 PM

## 2021-10-22 NOTE — PROGRESS NOTES
Mr. Jennifer Torres returns today in follow-up of his prior right Index Finger sharp open  mallet injury injury which occurred approximately 4 months ago. He was evaluated by   Dr. Anjana Echeverria. Vivek Parnell in July and was scheduled for surgical repair of his lacerated terminal extensor tendonit. He did not pursue and complete his surgery scheduling. He returns today with persistent mallet deformity of his right Middle Finger . He notes no symptoms of numbness, tingling, no symptoms related to perfusion. I have today reviewed with Jennifer Torres the clinically relevant, past medical history, medications, allergies,  family history, social history, and Review Of Systems & I have documented any details relevant to today's presenting complaints in my history above. Mr. Lise Phoenix Harper's self-reported past medical history, medications, allergies,  family history, social history, and Review Of Systems have been scanned into the chart under the \"Media\" tab. Physical Exam:  Vitals  Height: 5' 8\" (172.7 cm)  Weight: 200 lb (90.7 kg)  Mr. Jennifer Torres appears well, he is in no apparent distress, he demonstrates appropriate mood & affect. Skin:shows well healed lacerations of the dorsal right Index Finger and Middle Finger. There is no erythema, drainage, or sign of infection. otherwise normal  Bilaterally    Digits: right Middle Finger DIP joint shows full passive extension, 35 degrees active extensor lag, flexion is ful. Other Fingers & Thumb show without significant limitation bilaterally. There is not residual tenderness at the injury site  Wrist range of motion is full bilaterally  There is no evidence of gross joint instability bilaterally.   Sensation is decreased in the 'downstream' distribution from the laceration on the Right, normal on the Left  Vascular examination reveals normal and good capillary refill on the Right, normal on the Left  Swelling is mild in the injured  Middle Finger on the Right, normal on the Left  Muscular strength is clinically appropriate bilaterally. Impression:  Mr. Gareth Borges is doing fairly well after recent right Middle Finger Mallet injury. It would appear that he has not healed his injury and remains concerned about the lack of motion and function. .    Plan:  I have discussed with Mr. Gareth Borges the various treatment options for treatment of right  Middle Finger Terminal Extensor Tendon Laceration which has not healed. We discussed the options of Conservative treatment, and Surgical  repair of the injured tendon & other structures as needed. I have explained the pre-, rissa- ane post-operative considerations to him.  he has elected to proceed with surgical treatment in the form of exploration and repair of the injured tendon & other structures as needed. He has voiced an understanding of the other options available to him. I have explained the complications, limitations, expectations, alternatives, & risks of his chosen treatment. We discussed the possibility of residual symptoms as may be related to surgical treatment of tendon injuries including the possiblities of: Infection, poor healing of tissues, persistant deformity, persistant pain, limitation of motion, future arthritic symptoms & the possible need for further treatment. We also specifically discussed risks related to any surgical procedure including: bleeding, infection, scar formation, & possible need for repeat operation. He understood our discussion and was comfortable with his decision; he was provided with appropriate expectations. I had an extensive discussion with Mr. Gareth Borges  and any family members present regarding the natural history, etiology, and long term consequences of this problem. I have outlined a treatment plan with them and, in my opinion, surgical intervention is indicated at this time.  I have discussed with them the potential complications, limitations, expectations,

## 2021-10-22 NOTE — LETTER
CONSENT TO OPERATION  AND/OR OTHER PROCEDURE(S)          PATIENT : Aissatou Loyola   YOB: 1985      DATE : 10/22/21          1. I request and consent that Dr. Maco Doan. Vanita Thornton,  and/or his associates or assistants perform an operation and/or procedures on the above patient at  [place] [address], to treat the condition(s) which appear indicated by the diagnostic studies already performed. I have been told that in general terms the nature, purpose and reasonable expectations of the operation and/or procedure(s) are:      right Middle Finger Terminal Extensor Tendon Repair       2. It has been explained to me by the informing physician that during the course of the operation and/or procedure(s) unforeseen conditions may be revealed that necessitate an extension of the original operation and/or procedure(s) or different operation and/or procedures than those set forth in Paragraph 1. I therefore authorize and request that my physician and/or his associates or assistants perform such operations and/or procedures as are necessary and desirable in the exercise of professional judgment. The authority granted under this Paragraph 2 shall extend to all conditions that require treatment and are known to my physician at the time the operation is commenced. 3. I have been made aware by the informing physician of certain risks and consequences that are associated with the operation and/or procedure(s) described in Paragraph 1, the reasonable alternative methods or treatment, the possible consequences, the possibility that the operation and/or procedure(s) may be unsuccessful and the possibility of complications.   I understand the reasonably known risks to be:      - Bleeding  - Infection  - Poor Healing  - Possible Damage to Nerve, Vessel, Tendon/Muscle or Bone  - Need for further Treatment/Surgery  - Stiffness  - Pain  - Residual or Recurrent Symptoms  - Anesthetic and/or Medical Risks  - We have discussed the specific limitations and risks of hospital and/or office based treatment at this time due to the COVID-19 pandemic                I have been counseled about the risks of jim Covid-19 in the rissa-operative and post-operative periods related to this procedure. I have been made aware that jim Covid-19 around the time of a surgical procedure may worsen my prognosis for recovering from the virus and lend to a higher morbidity and or mortality risk. With this knowledge, I have requested to proceed with the procedure as scheduled. 4. I have also been informed by the informing physician that there are other risks from both known and unknown causes that are attendant to the performance of any surgical procedure. I am aware that the practice of medicine and surgery is not an exact science, and that no guarantees have been made to me concerning the results of the operation and/or procedure(s). 5. I   CONSENT / REFUSE CONSENT  (strike the phrase that does not apply) to the taking of photographs before, during and/or after the operation or procedure for scientific/educational purposes. 6. I consent to the administration of anesthesia and to the use of such anesthetics as may be deemed advisable by the anesthesiologist who has been engaged by me or my physician.     7. I certify that I have read and understand the above consent to operation and/or other procedure(s); that the explanations therein referred to were made to me by the informing physician in advance of my signing this consent; that all blanks or statements requiring insertion or completion were filled in and inapplicable paragraphs, if any, were stricken before I signed; and that all questions asked by me about the operation and/or procedure(s) which I have consented to have been fully answered in a satisfactory manner.                                 _______________________           10/22/21 Witness     Signature Of Patient         Date        Andreea Izaguirre. Lauri                                                 Informing Physician                                           Signature of Informing Physician                              If patient is unable to sign or is a minor, complete one of the following:    (A)  Patient is a minor   years of age. (B)  Patient is unable to sign because: The undersigned represents that he or she is duly authorized to execute this consent for and on behalf of the above named patient. Witness               o  Parent  o  Guardian   o  Spouse       o  Other (specify)                                           Patient Name: Conner Bernard  Patient YOB: 1985  Dr. Tyshawn Singh' Return To Work Policy  Regarding your ability to return to work after surgery or injury, Dr. Tyshawn Singh will not state that any patient is off of work or cannot work at all. He will place you on restrictions after your surgical procedure or injury. Depending on the details of your particular situation, Dr. Tyshawn Singh may state that you will have either light use or no use of your hand for a specific number of weeks. It is your obligation to communicate with your employer regarding your restrictions. It is your employer's decision as to whether they will accommodate your restrictions (i.e. allow you to come to work in your restricted capacity) or to not allow you to return to work under your restrictions. Dr. Tyshawn Singh does not participate in making this decision and cannot influence your employer regarding their decision. If you do not communicate your restrictions to your employer, or if you do not present to work as you are scheduled to, Dr. Tyshawn Singh will not provide an 'excuse' to explain your absence. A doctors note, or official forms (BWC, FMLA, etc.) will be filled out, upon request, to indicate your date of surgery and your restrictions as stated above.    [facility] Car[facility]' Narcotic Policy  Patients will only be prescribed narcotics after surgical procedures or significant injury. Not all procedures cause pain great enough to require Narcotics and thus, not all patients will receive prescriptions after surgical procedures or injuries. Narcotics are never prescribed for chronic conditions. Narcotics are never prescribed for use longer than one week at a time. Refills are only granted in unusual circumstances and only at Dr. Sonja Banuelos discretion. Patients who are receiving narcotic medication from another physician or who are under pain management contracts will not be given a prescription for narcotics for any reason. Surgery Arrival Time:  You have been advised of the START TIME for your surgery as well as the ARRIVAL TIME at which you need to arrive at the surgery facility. Please understand that there is a certain amount of preparation which takes place at the surgery facility prior to the start of your surgery. If you arrive later than your scheduled ARRIVAL TIME, it may be necessary to cancel your surgery for that day and reschedule your procedure due to lack of adequate time for pre-surgery preparations. Thank you for being on time for your arrival.    I have read the above policies and understand that by agreeing to proceed with treatment by Dr. Jarrett Baig and his team, that I am agreeing to abide by these policies.   Patient Name:  Jb Chance    Patient Signature:  _____________________________    Pramod Red Date:   10/22/21

## 2021-10-23 NOTE — PATIENT INSTRUCTIONS
Pre-Operative Instructions    1. The night before your surgery, unless otherwise instructed, do not eat any food, drink any liquids, chew gum or mints after midnight. Abstain from alcohol for 24 hours prior to surgery. 2. You will be contacted by the Hospital the working day prior to your procedure to confirm your arrival time. 3. Patients under 25years of age must have a parent or legal guardian present to sign their consent and discharge paperwork. 4. On the day of surgery,  you will be seen pre-operatively by an anesthesiologist.     5. If you are having hand surgery, it is recommended that nail polish and acrylic nails be removed prior to surgery if possible. 6. Please bring cases for glasses, contact lenses, hearing aids or dentures. They will likely be removed prior to surgery. 7. Wear casual, loose-fitting and comfortable clothing. Consider that you may have a large dressing to fit under your clothing after surgery. 9. Please do not bring valuables such as jewelry or large sums of cash to the hospital. Remove all body piercings before coming to the hospital. Betzy Gardiner may not  wear any rings on the hand if you are having surgery on that hand, wrist or elbow. 10. Do not smoke or chew tobacco before your surgery. [address] [place] [address] and surgery facilities are smoke-free environments. Smoking is not permitted anywhere on campus. 11. Be sure to follow any additional instructions from your physician. If the above conditions are not met, your surgery may be cancelled and rescheduled for another day. Should you develop any change in your health such as fever, cough, sore throat, cold, flu, or infection, or if you have any questions regarding your Pre-admission or surgery, please contact [address] - Surgery Scheduling at [phone], Monday through Friday, 9 a.m. to 5 p.m.

## 2021-10-25 ENCOUNTER — TELEPHONE (OUTPATIENT)
Dept: ORTHOPEDIC SURGERY | Age: 36
End: 2021-10-25

## 2021-11-04 ENCOUNTER — TELEPHONE (OUTPATIENT)
Dept: ORTHOPEDIC SURGERY | Age: 36
End: 2021-11-04

## 2021-11-04 NOTE — TELEPHONE ENCOUNTER
PERRY called patient and he was rude, said he was cancelling surg, they told him to call me, which he did not. I left message for him to call me on 11-3 and he did not. I called him early this am and he said he was not doing it and hung up on me.    IRMA/Pat informed

## 2022-03-02 ENCOUNTER — HOSPITAL ENCOUNTER (EMERGENCY)
Age: 37
Discharge: HOME | End: 2022-03-02
Attending: EMERGENCY MEDICINE
Payer: COMMERCIAL

## 2022-03-02 VITALS
WEIGHT: 222.44 LBS | HEART RATE: 54 BPM | SYSTOLIC BLOOD PRESSURE: 149 MMHG | HEIGHT: 68 IN | RESPIRATION RATE: 16 BRPM | DIASTOLIC BLOOD PRESSURE: 84 MMHG | BODY MASS INDEX: 33.71 KG/M2 | TEMPERATURE: 98.7 F | OXYGEN SATURATION: 98 %

## 2022-03-02 DIAGNOSIS — R30.0 DYSURIA: Primary | ICD-10-CM

## 2022-03-02 LAB
BACTERIA: ABNORMAL /HPF
BILIRUBIN URINE: NEGATIVE
BLOOD, URINE: NEGATIVE
CLARITY: CLEAR
COLOR: YELLOW
EPITHELIAL CELLS, UA: ABNORMAL /HPF (ref 0–5)
GLUCOSE URINE: NEGATIVE MG/DL
KETONES, URINE: NEGATIVE MG/DL
LEUKOCYTE ESTERASE, URINE: NEGATIVE
MICROSCOPIC EXAMINATION: YES
NITRITE, URINE: POSITIVE
PH UA: 7 (ref 5–8)
PROTEIN UA: NEGATIVE MG/DL
RBC UA: ABNORMAL /HPF (ref 0–4)
SPECIFIC GRAVITY UA: 1.02 (ref 1–1.03)
URINE REFLEX TO CULTURE: ABNORMAL
URINE TRICHOMONAS EVALUATION: NORMAL
URINE TYPE: ABNORMAL
UROBILINOGEN, URINE: 0.2 E.U./DL
WBC UA: ABNORMAL /HPF (ref 0–5)

## 2022-03-02 PROCEDURE — 96372 THER/PROPH/DIAG INJ SC/IM: CPT

## 2022-03-02 PROCEDURE — 6360000002 HC RX W HCPCS: Performed by: EMERGENCY MEDICINE

## 2022-03-02 PROCEDURE — 87491 CHLMYD TRACH DNA AMP PROBE: CPT

## 2022-03-02 PROCEDURE — 87086 URINE CULTURE/COLONY COUNT: CPT

## 2022-03-02 PROCEDURE — 2580000003 HC RX 258

## 2022-03-02 PROCEDURE — 6370000000 HC RX 637 (ALT 250 FOR IP): Performed by: EMERGENCY MEDICINE

## 2022-03-02 PROCEDURE — 87591 N.GONORRHOEAE DNA AMP PROB: CPT

## 2022-03-02 PROCEDURE — 81001 URINALYSIS AUTO W/SCOPE: CPT

## 2022-03-02 PROCEDURE — 99284 EMERGENCY DEPT VISIT MOD MDM: CPT

## 2022-03-02 RX ORDER — CEFTRIAXONE 500 MG/1
500 INJECTION, POWDER, FOR SOLUTION INTRAMUSCULAR; INTRAVENOUS ONCE
Status: COMPLETED | OUTPATIENT
Start: 2022-03-02 | End: 2022-03-02

## 2022-03-02 RX ORDER — DOXYCYCLINE HYCLATE 100 MG
100 TABLET ORAL ONCE
Status: COMPLETED | OUTPATIENT
Start: 2022-03-02 | End: 2022-03-02

## 2022-03-02 RX ORDER — DOXYCYCLINE HYCLATE 100 MG
100 TABLET ORAL 2 TIMES DAILY
Qty: 14 TABLET | Refills: 0 | Status: SHIPPED | OUTPATIENT
Start: 2022-03-02 | End: 2022-03-09

## 2022-03-02 RX ADMIN — WATER 2 ML: 1 INJECTION INTRAMUSCULAR; INTRAVENOUS; SUBCUTANEOUS at 12:41

## 2022-03-02 RX ADMIN — DOXYCYCLINE HYCLATE 100 MG: 100 TABLET, FILM COATED ORAL at 12:40

## 2022-03-02 RX ADMIN — CEFTRIAXONE SODIUM 500 MG: 500 INJECTION, POWDER, FOR SOLUTION INTRAMUSCULAR; INTRAVENOUS at 12:40

## 2022-03-02 ASSESSMENT — ENCOUNTER SYMPTOMS
SHORTNESS OF BREATH: 0
TROUBLE SWALLOWING: 0
WHEEZING: 0
VOICE CHANGE: 0

## 2022-03-02 NOTE — ED TRIAGE NOTES
Patient presents to ED complaining of urinary frequency x2 days, reports some low abdominal discomfort, aleveiated by Azo at home. Denies painful urination, denies discharge. Expresses concern for STD exposure. Patient resting on bed, respirations even and easy at this time. No obvious distress.

## 2022-03-02 NOTE — ED NOTES
Patient ambulatory from ED. AVS provided and discussed with patient. All questions answered. Patient verbalizes understanding of discharge instructions. Respirations even and easy. No obvious distress at this time. Patient advised to take full course of antibiotics as prescribed, even if symptoms begin to subside. Patient verbalizes understanding.        Florencio Gillis  03/02/22 7843

## 2022-03-02 NOTE — ED NOTES
Prescription called in to [facility] in [facility] by patient request.     Sarah Dickinson RN  03/02/22 1541

## 2022-03-02 NOTE — ED PROVIDER NOTES
[id number] [facility]  eMERGENCY dEPARTMENT eNCOUnter      Pt Name: Louise Reed  MRN: [id number]  [place] 1985  Date of evaluation: 3/2/2022  Provider: Alissa Pham MD    [address] [place] [address]       Chief Complaint   Patient presents with    Urinary Frequency     x2 days, reports some low abdominal discomfort, aleveiated by Azo at home. Denies painful urination, denies discharge. Expresses concern for STD exposure. HISTORY OF PRESENT ILLNESS   (Location/Symptom, Timing/Onset, Context/Setting, Quality, Duration, Modifying Factors, Severity)  Note limiting factors. Louise Reed is a 44 y.o. male reports of 2 days of mild low abdominal discomfort and frequent urination. Patient denies any dysuria. Patient has any penile rash or sores. Patient denies any history of diabetes. He does report unprotected sex reports he is concerned about an STD but does not know of any particular STD he has been exposed to. He denies any fever, weight loss, or night sweats. He denies any systemic symptoms. He denies any rash or sores. HPI    Nursing Notes were reviewed. REVIEW OFSYSTEMS    (2-9 systems for level 4, 10 or more for level 5)     Review of Systems   Constitutional: Negative for fever. HENT: Negative for drooling, trouble swallowing and voice change. Eyes: Negative for visual disturbance. Respiratory: Negative for shortness of breath and wheezing. Cardiovascular: Negative for chest pain and palpitations. Genitourinary: Positive for frequency. Negative for dysuria. Neurological: Negative for seizures and syncope. Psychiatric/Behavioral: Negative for self-injury and suicidal ideas. Except as noted above the remainder of the review of systems was reviewed and negative. PAST MEDICAL HISTORY   History reviewed. No pertinent past medical history.       SURGICAL HISTORY       Past Surgical History:   Procedure Laterality Date    DENTAL SURGERY CURRENT MEDICATIONS       Previous Medications    ACETAMINOPHEN (APAP EXTRA STRENGTH) 500 MG TABLET    1 tablet by mouth with breakfast, lunch, dinner, and before bedtime every day for the next 5 days. Take this with your Motrin. IBUPROFEN (ADVIL;MOTRIN) 600 MG TABLET    Take 1 tablet by mouth every 6 hours as needed for Pain       ALLERGIES     Patient has no known allergies. FAMILY HISTORY     History reviewed. No pertinent family history. SOCIAL HISTORY       Social History     Socioeconomic History    Marital status:      Spouse name: None    Number of children: None    Years of education: None    Highest education level: None   Occupational History    None   Tobacco Use    Smoking status: Former Smoker    Smokeless tobacco: Never Used    Tobacco comment: cigarellos   Vaping Use    Vaping Use: Never used   Substance and Sexual Activity    Alcohol use: Yes     Comment: 2-3 times per month    Drug use: Yes     Types: Marijuana Zollie Axe)     Comment: daily    Sexual activity: Yes   Other Topics Concern    None   Social History Narrative    None     Social Determinants of Health     Financial Resource Strain:     Difficulty of Paying Living Expenses: Not on file   Food Insecurity:     Worried About Running Out of Food in the Last Year: Not on file    Rodriguez of Food in the Last Year: Not on file   Transportation Needs:     Lack of Transportation (Medical): Not on file    Lack of Transportation (Non-Medical):  Not on file   Physical Activity:     Days of Exercise per Week: Not on file    Minutes of Exercise per Session: Not on file   Stress:     Feeling of Stress : Not on file   Social Connections:     Frequency of Communication with Friends and Family: Not on file    Frequency of Social Gatherings with Friends and Family: Not on file    Attends Mormon Services: Not on file    Active Member of Clubs or Organizations: Not on file    Attends Club or Organization Meetings: Not on file    Marital Status: Not on file   Intimate Partner Violence:     Fear of Current or Ex-Partner: Not on file    Emotionally Abused: Not on file    Physically Abused: Not on file    Sexually Abused: Not on file   Housing Stability:     Unable to Pay for Housing in the Last Year: Not on file    Number of [place] in the Last Year: Not on file    Unstable Housing in the Last Year: Not on file         PHYSICAL EXAM    (up to 7 for level 4, 8 or more for level 5)     ED Triage Vitals   BP Temp Temp Source Pulse Resp SpO2 Height Weight   03/02/22 1058 03/02/22 1058 03/02/22 1058 03/02/22 1058 03/02/22 1136 03/02/22 1136 03/02/22 1058 03/02/22 1058   (!) 143/87 98.6 °F (37 °C) Oral 58 18 98 % 5' 8\" (1.727 m) 222 lb 7.1 oz (100.9 kg)       Physical Exam  Vitals and nursing note reviewed. Constitutional:       General: He is not in acute distress. Appearance: He is well-developed. HENT:      Head: Normocephalic and atraumatic. Eyes:      Conjunctiva/sclera: Conjunctivae normal.   Neck:      Vascular: No JVD. Trachea: No tracheal deviation. Cardiovascular:      Rate and Rhythm: Normal rate. Pulmonary:      Effort: Pulmonary effort is normal. No respiratory distress. Abdominal:      General: Abdomen is flat. There is no distension. Palpations: There is no mass. Tenderness: There is no abdominal tenderness. Hernia: No hernia is present. Comments: Abdomen soft and nontender to palpation   Skin:     General: Skin is warm and dry. Neurological:      Mental Status: He is alert.          DIAGNOSTIC RESULTS       LABS:  Labs Reviewed   URINALYSIS WITH REFLEX TO CULTURE - Abnormal; Notable for the following components:       Result Value    Nitrite, Urine POSITIVE (*)     All other components within normal limits   MICROSCOPIC URINALYSIS - Abnormal; Notable for the following components:    Bacteria, UA Rare (*)     All other components within normal limits C. TRACHOMATIS N.GONORRHOEAE DNA, URINE   CULTURE, URINE   URINE TRICHOMONAS EVALUATION       All otherlabs were within normal range or not returned as of this dictation. EMERGENCY DEPARTMENT COURSE and DIFFERENTIAL DIAGNOSIS/MDM:   Vitals:    Vitals:    03/02/22 1058 03/02/22 1136   BP: (!) 143/87 (!) 152/100   Pulse: 58 59   Resp:  18   Temp: 98.6 °F (37 °C) 98.7 °F (37.1 °C)   TempSrc: Oral    SpO2:  98%   Weight: 222 lb 7.1 oz (100.9 kg)    Height: 5' 8\" (1.727 m)          MDM  Patient is afebrile, nontoxic-appearing, in no acute distress. Urine is nitrite positive. Trichomonas test is negative. We have discussed treating with p.o. antibiotics for possible UTI versus empiric treatment for possible gonorrhea and chlamydia. Patient reports he would like empiric treatment for gonorrhea and chlamydia. Urine culture is sent for outpatient follow-up for possible UTI and gonorrhea and Chlamydia test is currently in process at time of discharge. Patient is aware this was not comprehensive STD screening and specifically we did not test for HIV, syphilis, herpes, or hepatitis. Patient is referred to public health department as well as local STD clinics if he wants comprehensive STD screening. 6 precautions were also discussed. Strict ER return precautions given for fever, dysuria, abdominal pain, or worsening of symptoms as laboratory evaluation might need to be expanded if the symptoms occur. Patient expresses understanding and agreement with this plan and is discharged home. Procedures    FINAL IMPRESSION      1.  Dysuria          DISPOSITION/PLAN   DISPOSITION Decision To Discharge 03/02/2022 12:23:00 PM      PATIENT REFERRED TO:  See discharge instructions for public health department std screening information    In 1 day      [facility] Joshuamorigoberto  [phone]  In 1 week  Ask for an appointment with a primary care doctor    Rosalie Aceves [address] [facility]  [phone]    If symptoms worsen      DISCHARGE MEDICATIONS:  New Prescriptions    DOXYCYCLINE HYCLATE (VIBRA-TABS) 100 MG TABLET    Take 1 tablet by mouth 2 times daily for 7 days          (Please note that portions of this note were completed with a voice recognition program.  Efforts were made to edit the dictations but occasionally words aremis-transcribed. )    Cleo Morataya MD (electronically signed)  Attending Emergency Physician           Cleo Morataya MD  03/02/22 0829

## 2022-03-03 LAB
C. TRACHOMATIS DNA ,URINE: NEGATIVE
N. GONORRHOEAE DNA, URINE: NEGATIVE
URINE CULTURE, ROUTINE: NORMAL

## 2023-04-07 ENCOUNTER — HOSPITAL ENCOUNTER (EMERGENCY)
Age: 38
Discharge: HOME | End: 2023-04-07
Attending: EMERGENCY MEDICINE
Payer: COMMERCIAL

## 2023-04-07 ENCOUNTER — APPOINTMENT (OUTPATIENT)
Dept: GENERAL RADIOLOGY | Age: 38
End: 2023-04-07
Payer: COMMERCIAL

## 2023-04-07 VITALS
RESPIRATION RATE: 22 BRPM | DIASTOLIC BLOOD PRESSURE: 83 MMHG | TEMPERATURE: 97.9 F | BODY MASS INDEX: 35.31 KG/M2 | WEIGHT: 225 LBS | OXYGEN SATURATION: 100 % | HEIGHT: 67 IN | HEART RATE: 50 BPM | SYSTOLIC BLOOD PRESSURE: 140 MMHG

## 2023-04-07 DIAGNOSIS — R07.9 CHEST PAIN, UNSPECIFIED TYPE: Primary | ICD-10-CM

## 2023-04-07 LAB
ANION GAP SERPL CALCULATED.3IONS-SCNC: 8 MMOL/L (ref 3–16)
BASOPHILS # BLD: 0 K/UL (ref 0–0.2)
BASOPHILS NFR BLD: 0.9 %
BUN SERPL-MCNC: 10 MG/DL (ref 7–20)
CALCIUM SERPL-MCNC: 9.4 MG/DL (ref 8.3–10.6)
CHLORIDE SERPL-SCNC: 101 MMOL/L (ref 99–110)
CO2 SERPL-SCNC: 27 MMOL/L (ref 21–32)
CREAT SERPL-MCNC: 0.9 MG/DL (ref 0.9–1.3)
DEPRECATED RDW RBC AUTO: 14.4 % (ref 12.4–15.4)
EKG ATRIAL RATE: 46 BPM
EKG DIAGNOSIS: NORMAL
EKG P AXIS: 38 DEGREES
EKG P-R INTERVAL: 200 MS
EKG Q-T INTERVAL: 442 MS
EKG QRS DURATION: 102 MS
EKG QTC CALCULATION (BAZETT): 386 MS
EKG R AXIS: 14 DEGREES
EKG T AXIS: 8 DEGREES
EKG VENTRICULAR RATE: 46 BPM
EOSINOPHIL # BLD: 0 K/UL (ref 0–0.6)
EOSINOPHIL NFR BLD: 0.3 %
GFR SERPLBLD CREATININE-BSD FMLA CKD-EPI: >60 ML/MIN/{1.73_M2}
GLUCOSE SERPL-MCNC: 99 MG/DL (ref 70–99)
HCT VFR BLD AUTO: 42.2 % (ref 40.5–52.5)
HGB BLD-MCNC: 13.6 G/DL (ref 13.5–17.5)
LYMPHOCYTES # BLD: 2.2 K/UL (ref 1–5.1)
LYMPHOCYTES NFR BLD: 49.1 %
MCH RBC QN AUTO: 23.3 PG (ref 26–34)
MCHC RBC AUTO-ENTMCNC: 32.3 G/DL (ref 31–36)
MCV RBC AUTO: 72 FL (ref 80–100)
MONOCYTES # BLD: 0.3 K/UL (ref 0–1.3)
MONOCYTES NFR BLD: 6.1 %
NEUTROPHILS # BLD: 1.9 K/UL (ref 1.7–7.7)
NEUTROPHILS NFR BLD: 43.6 %
PLATELET # BLD AUTO: 217 K/UL (ref 135–450)
PMV BLD AUTO: 7.9 FL (ref 5–10.5)
POTASSIUM SERPL-SCNC: 4.3 MMOL/L (ref 3.5–5.1)
RBC # BLD AUTO: 5.86 M/UL (ref 4.2–5.9)
SODIUM SERPL-SCNC: 136 MMOL/L (ref 136–145)
TROPONIN T SERPL-MCNC: <0.01 NG/ML
WBC # BLD AUTO: 4.5 K/UL (ref 4–11)

## 2023-04-07 PROCEDURE — 80048 BASIC METABOLIC PNL TOTAL CA: CPT

## 2023-04-07 PROCEDURE — 85025 COMPLETE CBC W/AUTO DIFF WBC: CPT

## 2023-04-07 PROCEDURE — 93005 ELECTROCARDIOGRAM TRACING: CPT | Performed by: EMERGENCY MEDICINE

## 2023-04-07 PROCEDURE — 6370000000 HC RX 637 (ALT 250 FOR IP): Performed by: PHYSICIAN ASSISTANT

## 2023-04-07 PROCEDURE — 84484 ASSAY OF TROPONIN QUANT: CPT

## 2023-04-07 PROCEDURE — 36415 COLL VENOUS BLD VENIPUNCTURE: CPT

## 2023-04-07 PROCEDURE — 71046 X-RAY EXAM CHEST 2 VIEWS: CPT

## 2023-04-07 RX ORDER — LIDOCAINE 4 G/G
1 PATCH TOPICAL DAILY
Status: DISCONTINUED | OUTPATIENT
Start: 2023-04-07 | End: 2023-04-07 | Stop reason: HOSPADM

## 2023-04-07 ASSESSMENT — ENCOUNTER SYMPTOMS
SHORTNESS OF BREATH: 1
ABDOMINAL PAIN: 0
NAUSEA: 0
VOMITING: 0

## 2023-04-07 ASSESSMENT — PAIN - FUNCTIONAL ASSESSMENT
PAIN_FUNCTIONAL_ASSESSMENT: 0-10
PAIN_FUNCTIONAL_ASSESSMENT: NONE - DENIES PAIN

## 2023-04-07 ASSESSMENT — PAIN SCALES - GENERAL: PAINLEVEL_OUTOF10: 8

## 2023-04-07 ASSESSMENT — HEART SCORE: ECG: 0

## 2023-04-07 NOTE — DISCHARGE INSTRUCTIONS
tablets) of Tylenol in one day. Do not consume more than 3 alcoholic beverages while taking Tylenol. Do not take Tylenol if you have liver disease. Please follow-up with your primary care doctor as soon as possible regarding your visit to the ED. Return to the emergency department with any new or worsening of your symptoms, such as difficulty breathing, heart racing, or passing out. PLEASE FOLLOW UP WITH:  [facility] AT [place] [address] [address] [phone]    Schedule an appointment as soon as possible for a visit   To establish care with a primary care provider    Additional important information has been included with this packet, please make sure that you have read this information as it will better help you understand you illness/injury better.

## 2023-04-07 NOTE — ED NOTES
Patient prepared for and ready to be discharged. Patient discharged at this time in no acute distress after verbalizing understanding of discharge instructions. Patient left after receiving After Visit Summary instructions.        Regan Moon RN  04/07/23 4233

## 2023-04-07 NOTE — ED PROVIDER NOTES
ED Attending Attestation Note     Date of evaluation: 4/7/2023    This patient was seen by the advance practice provider. I have seen and examined the patient, agree with the workup, evaluation, management and diagnosis. The care plan has been discussed. I have reviewed the ECG and concur with the JOEY's interpretation. He is a 39-year-old male with a family cardiac history who presents to the emergency department today with chest pain for the past couple of days. On the left side of his chest.  He has noticed that it is better when he is outside working, but is worse when he is inside his home resting. It is a 8/10 at its worst.  It is a sharp, stabbing pain. He currently does not have any discomfort. He is presenting to the emergency department due to continued pain. He denies any medical history. No recent weight lifting or increased exertion. On exam, lungs are clear and equal.  Abdomen is soft and nontender. Radial pulses 2+. No murmur noted. I was unable to reproduce his chest discomfort by palpating on the chest and sternum.        Claire Cisneros MD  04/07/23 1144
Constitutional:       General: He is not in acute distress. Appearance: Normal appearance. He is normal weight. He is not ill-appearing, toxic-appearing or diaphoretic. HENT:      Head: Normocephalic and atraumatic. Right Ear: External ear normal.      Left Ear: External ear normal.      Nose: Nose normal.      Mouth/Throat:      Mouth: Mucous membranes are moist.      Pharynx: Oropharynx is clear. Eyes:      Extraocular Movements: Extraocular movements intact. Conjunctiva/sclera: Conjunctivae normal.   Cardiovascular:      Rate and Rhythm: Normal rate and regular rhythm. Pulses: Normal pulses. Heart sounds: Normal heart sounds. No murmur heard. Pulmonary:      Effort: Pulmonary effort is normal. No respiratory distress. Breath sounds: No wheezing, rhonchi or rales. Comments: Mild reproducible chest wall tenderness over the left lateral axillary region. Abdominal:      General: Abdomen is flat. Palpations: Abdomen is soft. Musculoskeletal:         General: Normal range of motion. Cervical back: Normal range of motion. Right lower leg: No edema. Left lower leg: No edema. Skin:     General: Skin is warm and dry. Neurological:      General: No focal deficit present. Mental Status: He is alert. Mental status is at baseline. Psychiatric:         Mood and Affect: Mood normal.         Behavior: Behavior normal.       Diagnostic Results     EKG   Interpreted in conjunction with emergency department physician Yoon Hermosillo MD  Rhythm: sinus bradycardia  Rate: 40-50  Axis: normal  : none  Conduction: normal  ST Segments: no acute change  T Waves: no acute change  Q Waves:none  Clinical Impression: no acute changes  Comparison:  12/01/2015    RADIOLOGY:  XR CHEST (2 VW)   Final Result   1. No acute cardiopulmonary abnormalities.              LABS:   Results for orders placed or performed during the hospital encounter of 04/07/23   CBC with Auto

## (undated) DEVICE — DRESSING PETRO W3XL3IN OIL EMUL N ADH GZ KNIT IMPREG CELOS

## (undated) DEVICE — SPONGE GZ W4XL4IN COT 12 PLY TYP VII WVN C FLD DSGN

## (undated) DEVICE — SHEET,DRAPE,53X77,STERILE: Brand: MEDLINE

## (undated) DEVICE — DRAPE HND W114XL142IN BLU POLYPR W O PCH FEN CRD AND TB HLDR

## (undated) DEVICE — SPLINT ORTH W4XL15IN LAYERED FBRGLS FOAM PD BRTH BK MOLD

## (undated) DEVICE — GLOVE SURG SZ 65 CRM LTX FREE POLYISOPRENE POLYMER BEAD ANTI

## (undated) DEVICE — SYRINGE MED 10ML LUERLOCK TIP W/O SFTY DISP

## (undated) DEVICE — ZIMMER® STERILE DISPOSABLE TOURNIQUET CUFF WITH PLC, DUAL PORT, SINGLE BLADDER, 18 IN. (46 CM)

## (undated) DEVICE — PADDING UNDERCAST W4INXL4YD 100% COT CRIMPED FINISH WBRL II

## (undated) DEVICE — BANDAGE COMPR W2INXL5YD TAN BRTH SELF ADH WRP W/ HND TEAR

## (undated) DEVICE — COVER LT HNDL BLU PLAS

## (undated) DEVICE — MINOR SET UP PK

## (undated) DEVICE — GOWN SIRUS NONREIN XL W/TWL: Brand: MEDLINE INDUSTRIES, INC.

## (undated) DEVICE — GLOVE SURG SZ 75 CRM LTX FREE POLYISOPRENE POLYMER BEAD ANTI

## (undated) DEVICE — NEEDLE HYPO 25GA L1.5IN BVL ORIENTED ECLIPSE

## (undated) DEVICE — BANDAGE COMPR W4INXL12FT E DISP ESMARCH EVEN

## (undated) DEVICE — GOWN,SIRUS,POLYRNF,BRTHSLV,XL,30/CS: Brand: MEDLINE

## (undated) DEVICE — UNDERGLOVE SURG SZ 8 FNGR THK0.21MIL GRN LTX BEAD CUF

## (undated) DEVICE — APPLICATOR MEDICATED 26 CC SOLUTION HI LT ORNG CHLORAPREP

## (undated) DEVICE — SOLUTION IV IRRIG 250ML ST LF 0.9% SODIUM 2F7122

## (undated) DEVICE — PRECISION THIN (5.5 X 0.38 X 18.0MM)